# Patient Record
Sex: MALE | Race: BLACK OR AFRICAN AMERICAN | URBAN - METROPOLITAN AREA
[De-identification: names, ages, dates, MRNs, and addresses within clinical notes are randomized per-mention and may not be internally consistent; named-entity substitution may affect disease eponyms.]

---

## 2023-05-22 ENCOUNTER — OFFICE VISIT (OUTPATIENT)
Age: 63
End: 2023-05-22
Payer: COMMERCIAL

## 2023-05-22 VITALS
OXYGEN SATURATION: 96 % | WEIGHT: 293 LBS | BODY MASS INDEX: 38.83 KG/M2 | SYSTOLIC BLOOD PRESSURE: 122 MMHG | HEIGHT: 73 IN | DIASTOLIC BLOOD PRESSURE: 72 MMHG | HEART RATE: 90 BPM | RESPIRATION RATE: 18 BRPM

## 2023-05-22 DIAGNOSIS — R06.09 DOE (DYSPNEA ON EXERTION): Primary | ICD-10-CM

## 2023-05-22 PROCEDURE — 99204 OFFICE O/P NEW MOD 45 MIN: CPT | Performed by: INTERNAL MEDICINE

## 2023-05-22 RX ORDER — ORAL SEMAGLUTIDE 7 MG/1
TABLET ORAL
COMMUNITY

## 2023-05-22 NOTE — PROGRESS NOTES
[facility]    Christel Epps is 58 y.o. male with BPH, diabetes, sleep apnea, obesity    Patient is here today for cardioversion  He denies prior history of MI or CHF  Over last couple years he has been noticing some lower extremity swelling. No dyspnea however still perform activity of daily limitation. Chest pain or chest tightness. He plays soccer. He denies palpitation, presyncope or syncope  Denies any nausea, vomiting, abdominal pain, fever, chills, sputum production. No hematuria or other bleeding complaints    Past Medical History:   Diagnosis Date    BPH (benign prostatic hyperplasia)     Diabetes (HCC)     Elevated PSA     History of vitamin D deficiency     Hypothyroidism     Obesity     ANDREW (obstructive sleep apnea)     Testicular hypofunction        Review of Systems:  Cardiac symptoms as noted above in HPI. All others negative. Denies fatigue, malaise, skin rash, joint pain, blurring vision, photophobia, neck pain, hemoptysis, chronic cough, nausea, vomiting, hematuria, burning micturition, BRBPR, chronic headaches. Current Outpatient Medications   Medication Sig    Semaglutide (RYBELSUS) 7 MG TABS Take by mouth    NAPROXEN SODIUM PO Take by mouth    ibuprofen (ADVIL;MOTRIN) 800 MG tablet Take by mouth    metFORMIN (GLUCOPHAGE) 500 MG tablet Take by mouth 2 times daily (with meals)    thyroid (ARMOUR) 120 MG tablet ceived the following from [facility]: Outside name: ARMOUR THYROID 120 mg tab     No current facility-administered medications for this visit.        Past Surgical History:   Procedure Laterality Date    COLONOSCOPY  2010    VASECTOMY         Allergies and Sensitivities:  No Known Allergies    Family History:  Family History   Problem Relation Age of Onset    Alzheimer's Disease Father     Diabetes Father     Emphysema Mother     Heart Failure Mother        Social History:  Social History     Tobacco

## 2023-07-03 ENCOUNTER — TELEPHONE (OUTPATIENT)
Age: 63
End: 2023-07-03

## 2023-07-03 DIAGNOSIS — I71.21 ANEURYSM OF ASCENDING AORTA WITHOUT RUPTURE (HCC): Primary | ICD-10-CM

## 2023-07-03 NOTE — TELEPHONE ENCOUNTER
----- Message from Payton Barba MD sent at 6/16/2023  1:18 PM EDT -----  Please inform patient regarding abnormal test findings.      LVEF normal, no major valvular heart disease  Aorta is dilated at 4.1 cm  He will need CTA with contrast of chest for aortic aneurysm    Thank you  SP

## 2023-07-03 NOTE — TELEPHONE ENCOUNTER
Called patient to inform of test results. Patient verified name and . Patient verbalized and understood results.       Order placed for CTA

## 2023-07-27 ENCOUNTER — HOSPITAL ENCOUNTER (OUTPATIENT)
Facility: HOSPITAL | Age: 63
Discharge: HOME | End: 2023-07-27
Attending: INTERNAL MEDICINE
Payer: COMMERCIAL

## 2023-07-27 DIAGNOSIS — I71.21 ANEURYSM OF ASCENDING AORTA WITHOUT RUPTURE (HCC): ICD-10-CM

## 2023-07-27 LAB — CREAT UR-MCNC: 1.3 MG/DL (ref 0.6–1.3)

## 2023-07-27 PROCEDURE — 6360000004 HC RX CONTRAST MEDICATION: Performed by: INTERNAL MEDICINE

## 2023-07-27 PROCEDURE — 71275 CT ANGIOGRAPHY CHEST: CPT

## 2023-07-27 PROCEDURE — 82565 ASSAY OF CREATININE: CPT

## 2023-07-27 RX ADMIN — IOPAMIDOL 100 ML: 755 INJECTION, SOLUTION INTRAVENOUS at 08:30

## 2023-08-08 ENCOUNTER — TELEPHONE (OUTPATIENT)
Age: 63
End: 2023-08-08

## 2023-08-08 DIAGNOSIS — I71.21 ANEURYSM OF ASCENDING AORTA WITHOUT RUPTURE (HCC): Primary | ICD-10-CM

## 2023-08-08 DIAGNOSIS — R06.09 DOE (DYSPNEA ON EXERTION): ICD-10-CM

## 2023-08-08 NOTE — TELEPHONE ENCOUNTER
----- Message from Casie Carroll MD sent at 8/7/2023  7:29 AM EDT -----  Please inform patient regarding abnormal test findings. 1. Ascending aorta measures up to 4 cm.   [facility] repeat CT in 12 months      Thank you  SP

## 2023-08-08 NOTE — TELEPHONE ENCOUNTER
Called patient to inform of test results. Patient verified name and . Patient verbalized and understood results.     Will put CT order in for next August.

## 2024-08-05 ENCOUNTER — HOSPITAL ENCOUNTER (OUTPATIENT)
Facility: HOSPITAL | Age: 64
Discharge: HOME | End: 2024-08-08
Attending: INTERNAL MEDICINE
Payer: COMMERCIAL

## 2024-08-05 DIAGNOSIS — R06.09 DOE (DYSPNEA ON EXERTION): ICD-10-CM

## 2024-08-05 DIAGNOSIS — I71.21 ANEURYSM OF ASCENDING AORTA WITHOUT RUPTURE (HCC): Primary | ICD-10-CM

## 2024-08-05 DIAGNOSIS — I71.21 ANEURYSM OF ASCENDING AORTA WITHOUT RUPTURE (HCC): ICD-10-CM

## 2024-08-05 LAB — CREAT UR-MCNC: 1.2 MG/DL (ref 0.6–1.3)

## 2024-08-05 PROCEDURE — 6360000004 HC RX CONTRAST MEDICATION: Performed by: INTERNAL MEDICINE

## 2024-08-05 PROCEDURE — 71275 CT ANGIOGRAPHY CHEST: CPT

## 2024-08-05 PROCEDURE — 82565 ASSAY OF CREATININE: CPT

## 2024-08-05 RX ADMIN — IOPAMIDOL 100 ML: 755 INJECTION, SOLUTION INTRAVENOUS at 10:51

## 2024-08-05 NOTE — PROGRESS NOTES
Order changed to correct order for aneurysm eval, same order as last year's study. Provider is on vacation and cannot esign until back in the office.